# Patient Record
Sex: MALE | Race: BLACK OR AFRICAN AMERICAN | NOT HISPANIC OR LATINO | URBAN - METROPOLITAN AREA
[De-identification: names, ages, dates, MRNs, and addresses within clinical notes are randomized per-mention and may not be internally consistent; named-entity substitution may affect disease eponyms.]

---

## 2019-07-01 ENCOUNTER — EMERGENCY (EMERGENCY)
Facility: HOSPITAL | Age: 64
LOS: 1 days | End: 2019-07-01
Admitting: EMERGENCY MEDICINE
Payer: COMMERCIAL

## 2019-07-01 PROCEDURE — 73130 X-RAY EXAM OF HAND: CPT | Mod: 26,RT

## 2019-07-01 PROCEDURE — 99283 EMERGENCY DEPT VISIT LOW MDM: CPT

## 2020-09-14 VITALS
DIASTOLIC BLOOD PRESSURE: 80 MMHG | WEIGHT: 222 LBS | HEART RATE: 50 BPM | TEMPERATURE: 97.5 F | OXYGEN SATURATION: 99 % | SYSTOLIC BLOOD PRESSURE: 140 MMHG | BODY MASS INDEX: 31.08 KG/M2 | HEIGHT: 71 IN

## 2021-05-06 ENCOUNTER — NON-APPOINTMENT (OUTPATIENT)
Age: 66
End: 2021-05-06

## 2021-05-06 ENCOUNTER — RESULT CHARGE (OUTPATIENT)
Age: 66
End: 2021-05-06

## 2021-05-06 DIAGNOSIS — Z87.438 PERSONAL HISTORY OF OTHER DISEASES OF MALE GENITAL ORGANS: ICD-10-CM

## 2021-05-06 DIAGNOSIS — Z87.891 PERSONAL HISTORY OF NICOTINE DEPENDENCE: ICD-10-CM

## 2021-05-06 DIAGNOSIS — Z87.828 PERSONAL HISTORY OF OTHER (HEALED) PHYSICAL INJURY AND TRAUMA: ICD-10-CM

## 2021-05-07 ENCOUNTER — NON-APPOINTMENT (OUTPATIENT)
Age: 66
End: 2021-05-07

## 2021-05-07 ENCOUNTER — APPOINTMENT (OUTPATIENT)
Dept: FAMILY MEDICINE | Facility: CLINIC | Age: 66
End: 2021-05-07
Payer: COMMERCIAL

## 2021-05-07 VITALS
SYSTOLIC BLOOD PRESSURE: 160 MMHG | BODY MASS INDEX: 28.98 KG/M2 | DIASTOLIC BLOOD PRESSURE: 90 MMHG | WEIGHT: 207 LBS | HEIGHT: 71 IN | TEMPERATURE: 97.3 F | HEART RATE: 62 BPM | OXYGEN SATURATION: 97 %

## 2021-05-07 DIAGNOSIS — Z00.00 ENCOUNTER FOR GENERAL ADULT MEDICAL EXAMINATION W/OUT ABNORMAL FINDINGS: ICD-10-CM

## 2021-05-07 LAB
BILIRUB UR QL STRIP: NORMAL
CLARITY UR: CLEAR
COLLECTION METHOD: NORMAL
GLUCOSE UR-MCNC: NORMAL
HCG UR QL: 0.2 EU/DL
HGB UR QL STRIP.AUTO: NORMAL
KETONES UR-MCNC: NORMAL
LEUKOCYTE ESTERASE UR QL STRIP: NORMAL
NITRITE UR QL STRIP: NORMAL
PH UR STRIP: 7.5
PROT UR STRIP-MCNC: 100
SP GR UR STRIP: 1.02

## 2021-05-07 PROCEDURE — 99072 ADDL SUPL MATRL&STAF TM PHE: CPT

## 2021-05-07 PROCEDURE — 99396 PREV VISIT EST AGE 40-64: CPT | Mod: 25

## 2021-05-07 PROCEDURE — 81003 URINALYSIS AUTO W/O SCOPE: CPT | Mod: NC,QW

## 2021-05-07 PROCEDURE — 93000 ELECTROCARDIOGRAM COMPLETE: CPT

## 2021-05-07 NOTE — PHYSICAL EXAM
[No Edema] : there was no peripheral edema [Normal Appearance] : normal in appearance [Declined Rectal Exam] : declined rectal exam [Normal] : affect was normal and insight and judgment were intact

## 2021-05-07 NOTE — REVIEW OF SYSTEMS
[Recent Change In Weight] : ~T recent weight change [Negative] : Neurological [Abdominal Pain] : no abdominal pain [Vomiting] : no vomiting [FreeTextEntry2] : 15 POUND WEIGHT  1N  7 MONTHS [FreeTextEntry7] : URGENCY INCONT. OF BOWELS [FreeTextEntry8] : URGENCY OF BOWEL MOVEMENTS

## 2021-05-07 NOTE — PLAN
[FreeTextEntry1] : DISCUSSED NEED FOR GI CONSULT  15 POUND WEIGHT LOSS  BOWEL URGENCY WITH INCONTENENCE\par LIST GIVE\par \par \par EKG   T WAVE  ABNORMALITY  AS COMPARED TO PREVIOUS  EKG  2019\par \par CARD  REF   LIST GIVEN\par \par RX GIVEN FOR FASTING LABS\par \par

## 2021-05-07 NOTE — HISTORY OF PRESENT ILLNESS
[FreeTextEntry1] : HAS BEEN OFF MEDS FOR SEVERAL MONTHS\par \par 15 POUND WEIGHT LOSS\par DIFFICULY WITH BOWELS\par URGENCY INCONTENANC\par COLONOSCOPY  GREATER THEN 5 YEARS AGO\par \par FAMILY HISTORY\par NEG  FOR CAD  DIAB  AND CANCER

## 2022-06-16 ENCOUNTER — APPOINTMENT (OUTPATIENT)
Dept: CT IMAGING | Facility: CLINIC | Age: 67
End: 2022-06-16
Payer: COMMERCIAL

## 2022-06-16 PROCEDURE — 73700 CT LOWER EXTREMITY W/O DYE: CPT | Mod: LT

## 2023-01-30 ENCOUNTER — NON-APPOINTMENT (OUTPATIENT)
Age: 68
End: 2023-01-30

## 2023-01-30 ENCOUNTER — APPOINTMENT (OUTPATIENT)
Dept: FAMILY MEDICINE | Facility: CLINIC | Age: 68
End: 2023-01-30
Payer: COMMERCIAL

## 2023-01-30 VITALS
BODY MASS INDEX: 30.88 KG/M2 | SYSTOLIC BLOOD PRESSURE: 166 MMHG | HEART RATE: 90 BPM | DIASTOLIC BLOOD PRESSURE: 98 MMHG | TEMPERATURE: 97.3 F | OXYGEN SATURATION: 98 % | WEIGHT: 233 LBS | HEIGHT: 73 IN | RESPIRATION RATE: 15 BRPM

## 2023-01-30 PROCEDURE — 99214 OFFICE O/P EST MOD 30 MIN: CPT

## 2023-01-30 NOTE — HISTORY OF PRESENT ILLNESS
[FreeTextEntry8] : presents with cough\par \par increases at night\par \par has not been taking any meds for it\par \par \par stopped  b/p  and statin meds greater then 2 years ago

## 2023-01-30 NOTE — PLAN
[FreeTextEntry1] : discussed importance of managing blood pressure\par multiple  risk factors for heart disease\par \par card.  ref\par \par \par OTC  cough meds that are made for high b/p\par \par RTO  for physical  with fasting  labs\par

## 2023-02-01 LAB
RAPID RVP RESULT: NOT DETECTED
SARS-COV-2 RNA PNL RESP NAA+PROBE: NOT DETECTED

## 2023-02-13 ENCOUNTER — APPOINTMENT (OUTPATIENT)
Dept: CARDIOLOGY | Facility: CLINIC | Age: 68
End: 2023-02-13

## 2023-04-28 ENCOUNTER — RX RENEWAL (OUTPATIENT)
Age: 68
End: 2023-04-28

## 2023-06-15 ENCOUNTER — APPOINTMENT (OUTPATIENT)
Dept: FAMILY MEDICINE | Facility: CLINIC | Age: 68
End: 2023-06-15
Payer: COMMERCIAL

## 2023-06-15 VITALS
DIASTOLIC BLOOD PRESSURE: 78 MMHG | HEART RATE: 63 BPM | OXYGEN SATURATION: 97 % | BODY MASS INDEX: 30.88 KG/M2 | TEMPERATURE: 97.2 F | SYSTOLIC BLOOD PRESSURE: 110 MMHG | WEIGHT: 233 LBS | RESPIRATION RATE: 15 BRPM | HEIGHT: 73 IN

## 2023-06-15 PROCEDURE — 99496 TRANSJ CARE MGMT HIGH F2F 7D: CPT

## 2023-06-19 ENCOUNTER — NON-APPOINTMENT (OUTPATIENT)
Age: 68
End: 2023-06-19

## 2023-06-19 ENCOUNTER — APPOINTMENT (OUTPATIENT)
Dept: CARDIOLOGY | Facility: CLINIC | Age: 68
End: 2023-06-19
Payer: COMMERCIAL

## 2023-06-19 ENCOUNTER — APPOINTMENT (OUTPATIENT)
Dept: FAMILY MEDICINE | Facility: CLINIC | Age: 68
End: 2023-06-19
Payer: COMMERCIAL

## 2023-06-19 VITALS
WEIGHT: 227 LBS | HEART RATE: 59 BPM | OXYGEN SATURATION: 98 % | SYSTOLIC BLOOD PRESSURE: 110 MMHG | HEIGHT: 73 IN | DIASTOLIC BLOOD PRESSURE: 80 MMHG | TEMPERATURE: 98.1 F | BODY MASS INDEX: 30.09 KG/M2

## 2023-06-19 DIAGNOSIS — Z82.49 FAMILY HISTORY OF ISCHEMIC HEART DISEASE AND OTHER DISEASES OF THE CIRCULATORY SYSTEM: ICD-10-CM

## 2023-06-19 DIAGNOSIS — R15.2 FULL INCONTINENCE OF FECES: ICD-10-CM

## 2023-06-19 DIAGNOSIS — Z86.59 PERSONAL HISTORY OF OTHER MENTAL AND BEHAVIORAL DISORDERS: ICD-10-CM

## 2023-06-19 DIAGNOSIS — Z92.89 PERSONAL HISTORY OF OTHER MEDICAL TREATMENT: ICD-10-CM

## 2023-06-19 DIAGNOSIS — F10.21 ALCOHOL DEPENDENCE, IN REMISSION: ICD-10-CM

## 2023-06-19 DIAGNOSIS — R15.9 FULL INCONTINENCE OF FECES: ICD-10-CM

## 2023-06-19 DIAGNOSIS — Z78.9 OTHER SPECIFIED HEALTH STATUS: ICD-10-CM

## 2023-06-19 PROCEDURE — 93000 ELECTROCARDIOGRAM COMPLETE: CPT

## 2023-06-19 PROCEDURE — 99205 OFFICE O/P NEW HI 60 MIN: CPT | Mod: 25

## 2023-06-19 PROCEDURE — 99214 OFFICE O/P EST MOD 30 MIN: CPT

## 2023-06-19 NOTE — HISTORY OF PRESENT ILLNESS
[FreeTextEntry1] : 67 years old -American gentleman came for cardiac evaluation after discharge from [facility], I have reviewed discharge summary.\par \par He was admitted for uncontrolled hypertension, initially he needed nitroglycerin drip, subsequently his blood pressure is well controlled on p.o. medications.  He also had metabolic encephalopathy requiring psych evaluation.  Echocardiogram confirmed dilated hypertrophied LV with LVIDD 6.6, LVEF 25%.  Nuclear stress test showed small to medium size defect of mild to moderate severity in the basal inferior and apical inferior wall suggestive of infarct with a diaphragmatic attenuation artifact.  CT brain did not confirm any infarct.  He was diagnosed to have acute on chronic systolic heart failure.\par \par Since discharge patient has been doing well.  He denies any chest pain, PND, orthopnea, diaphoresis, dizziness, palpitation Edema.  He is avoiding salt.

## 2023-06-19 NOTE — HISTORY OF PRESENT ILLNESS
[FreeTextEntry1] : improved  [de-identified] : from hosp \par short term memory \par coreg lisiopril spironolactone lasix  atorvastatin asa\par folic acid \par aneurysm \par cardiology \par cough lisinopriil\par

## 2023-06-19 NOTE — PLAN
[FreeTextEntry1] : 67-year-old gentleman presents for evaluation.  Accompanied by his wife.  Recently discharged from the hospital he returns for follow-up on his second visit.  They bring in a list of his medications\par Congestive cardiomyopathy–Lasix lisinopril and beta blockage.  Wife complaining that he coughs constantly.  A trial off lisinopril was offered\par 110/80 pulse rate 59 and regular this is 6 foot 1 inch 227 pound gentleman who is now planning to retire from his job\par Cognitive issues–memory loss is described by both the patient and his wife.  No medications at this time he still able to function highly\par Hyperlipidemia–controlled with statin periodic lab work is drawn.  He is ordered for a fasting blood profile

## 2023-06-23 ENCOUNTER — APPOINTMENT (OUTPATIENT)
Dept: FAMILY MEDICINE | Facility: CLINIC | Age: 68
End: 2023-06-23
Payer: COMMERCIAL

## 2023-06-23 VITALS
WEIGHT: 221 LBS | DIASTOLIC BLOOD PRESSURE: 64 MMHG | TEMPERATURE: 97.8 F | BODY MASS INDEX: 29.29 KG/M2 | SYSTOLIC BLOOD PRESSURE: 118 MMHG | HEIGHT: 73 IN | RESPIRATION RATE: 16 BRPM | HEART RATE: 60 BPM | OXYGEN SATURATION: 96 %

## 2023-06-23 DIAGNOSIS — H66.90 OTITIS MEDIA, UNSPECIFIED, UNSPECIFIED EAR: ICD-10-CM

## 2023-06-23 PROCEDURE — 99213 OFFICE O/P EST LOW 20 MIN: CPT

## 2023-06-23 RX ORDER — LISINOPRIL 20 MG/1
20 TABLET ORAL DAILY
Qty: 90 | Refills: 1 | Status: DISCONTINUED | COMMUNITY
Start: 2023-06-14 | End: 2023-06-23

## 2023-06-23 NOTE — PLAN
[FreeTextEntry1] : Otitis media/ sinus infection- prescribed augmentin 875mg BID for 7 days. \par \par Cough- dry/non productive. no wheezing or adventitious sounds noted. Patient will continue to monitor. If wheezing, sob, difficulty breathing or worsening of cough occurs contact office or go to ER. Patient advised on OTC Sinus and cold medications due to HTN. \par \par

## 2023-06-23 NOTE — HISTORY OF PRESENT ILLNESS
[FreeTextEntry8] : Patient presents for continued cough since stopping lisinopril, left ear problems and sinus congestion. He denies ear pain, but has a crackling sensation. Symptoms have continued since his last visit. Cough is nonproductive, he denies fever, wheezing, sob, chest pain or other complaints at this time. He denies sinus drainage or throat pain. Just maxillary sinus pressure. Wife is at bedside.

## 2023-07-04 NOTE — HISTORY OF PRESENT ILLNESS
[Discharge Summary] : discharge summary [Discharge Med List] : discharge medication list [Med Reconciliation] : medication reconciliation has been completed [Patient Contacted By: ____] : and contacted by [unfilled] [Post-hospitalization from ___ Hospital] : Post-hospitalization from [unfilled] Hospital [Discharged on ___] : discharged on [unfilled] [FreeTextEntry2] : chf  /  Mi?\par ischemic mi \par

## 2023-07-04 NOTE — PLAN
[FreeTextEntry1] : 67-year-old gentleman, accompanied by his wife, presents status post hospitalization\par He was hospitalized on June 60 admitted with shortness of breath weakness and confusion\par He presents confused pleasant with memory issues\par Acute on chronic congestive heart failure–medications titrated\par Lisinopril/Coreg/Lasix/spironolactone/aspirin\par Hypertension controlled with medication\par 110/70\par Ischemic MRI–follows with cardiology\par Medication titrated\par Encephalopathy–during his hospital stay he had cognitive D flat defect and was acting out causing the use of Abilify for behavioral control\par Aripiprazole DC'd\par The wife is aware to call me for any issues\par They will return next week for blood pressure monitoring and medication titration\par Medication is held today and he will be monitored by his wife and follow-up

## 2023-07-24 ENCOUNTER — NON-APPOINTMENT (OUTPATIENT)
Age: 68
End: 2023-07-24

## 2023-07-24 ENCOUNTER — APPOINTMENT (OUTPATIENT)
Dept: FAMILY MEDICINE | Facility: CLINIC | Age: 68
End: 2023-07-24
Payer: COMMERCIAL

## 2023-07-24 VITALS
TEMPERATURE: 98.6 F | BODY MASS INDEX: 29.19 KG/M2 | DIASTOLIC BLOOD PRESSURE: 70 MMHG | SYSTOLIC BLOOD PRESSURE: 109 MMHG | HEART RATE: 62 BPM | OXYGEN SATURATION: 94 % | HEIGHT: 73 IN | WEIGHT: 220.25 LBS

## 2023-07-24 PROCEDURE — 93000 ELECTROCARDIOGRAM COMPLETE: CPT

## 2023-07-24 PROCEDURE — 99213 OFFICE O/P EST LOW 20 MIN: CPT | Mod: 25

## 2023-07-24 NOTE — PLAN
[FreeTextEntry1] : Pain with deep breath- stopped yesterday. EKG reviewed by Dr. Mtz. No acute changes or concerns. Educated patient EKG is a "snapshot". It can show past damage but not show future damage or problems. Educated if pain or symptoms return to go to ER for evaluation. Patient and spouse verbalized understanding. \par \par Patient has appointment with his cardiologist next month.

## 2023-07-24 NOTE — REVIEW OF SYSTEMS
[Chest Pain] : chest pain [Negative] : Heme/Lymph [Shortness Of Breath] : no shortness of breath [Cough] : no cough [Dyspnea on Exertion] : not dyspnea on exertion

## 2023-07-24 NOTE — HISTORY OF PRESENT ILLNESS
[FreeTextEntry8] : Patient presents for left sided chest pain. States pain started after he was working with a saw on a garage door. Patient was sitting when pain started. Pain was a dull/sharp pain when he took a deep breath. Pain was yesterday. He denies pain today. He denies cough or other complaints at this time.

## 2023-08-03 ENCOUNTER — APPOINTMENT (OUTPATIENT)
Dept: FAMILY MEDICINE | Facility: CLINIC | Age: 68
End: 2023-08-03
Payer: COMMERCIAL

## 2023-08-03 VITALS
TEMPERATURE: 97.8 F | HEART RATE: 56 BPM | BODY MASS INDEX: 28.94 KG/M2 | SYSTOLIC BLOOD PRESSURE: 140 MMHG | DIASTOLIC BLOOD PRESSURE: 90 MMHG | WEIGHT: 218.38 LBS | OXYGEN SATURATION: 99 % | HEIGHT: 73 IN

## 2023-08-03 PROCEDURE — 99213 OFFICE O/P EST LOW 20 MIN: CPT

## 2023-08-03 NOTE — HISTORY OF PRESENT ILLNESS
[FreeTextEntry8] : Patient presents for rash to left leg. It started 2 weeks ago. It is currently dry and, patchy and pink in color. It was originally bright red. He denies fevers, body aches or joint pain at this time. He works outside constantly.

## 2023-08-03 NOTE — PLAN
[FreeTextEntry1] : Rash- tick panel ordered. denies previous tick illnesses. Clobetasol cream BID for possible dermatitis. Patient will go to outside lab due to difficult draw. Informed to call office if fevers, joint pain or worsening of rash occurs.

## 2023-08-28 ENCOUNTER — APPOINTMENT (OUTPATIENT)
Dept: CARDIOLOGY | Facility: CLINIC | Age: 68
End: 2023-08-28

## 2023-09-06 ENCOUNTER — RX RENEWAL (OUTPATIENT)
Age: 68
End: 2023-09-06

## 2023-09-14 ENCOUNTER — APPOINTMENT (OUTPATIENT)
Dept: FAMILY MEDICINE | Facility: CLINIC | Age: 68
End: 2023-09-14
Payer: COMMERCIAL

## 2023-09-14 ENCOUNTER — LABORATORY RESULT (OUTPATIENT)
Age: 68
End: 2023-09-14

## 2023-09-14 VITALS
SYSTOLIC BLOOD PRESSURE: 159 MMHG | HEIGHT: 73 IN | TEMPERATURE: 98 F | WEIGHT: 225.5 LBS | BODY MASS INDEX: 29.88 KG/M2 | HEART RATE: 51 BPM | OXYGEN SATURATION: 99 % | DIASTOLIC BLOOD PRESSURE: 100 MMHG

## 2023-09-14 PROCEDURE — 36415 COLL VENOUS BLD VENIPUNCTURE: CPT

## 2023-09-14 PROCEDURE — 99214 OFFICE O/P EST MOD 30 MIN: CPT | Mod: 25

## 2023-09-20 LAB
ANAPLASMA PHAGOCYTOPHILIA IGG ANTIBODIES: NEGATIVE
ANAPLASMA PHAGOCYTOPHILIA IGM ANTIBODIES: NEGATIVE
EHRLICIA CHAFFEENIS IGG ANTIBODIES: NEGATIVE
EHRLICIA CHAFFEENIS IGM ANTIBODIES: NEGATIVE
ERHLICIA RESULT COMMENT: NORMAL

## 2023-09-28 ENCOUNTER — APPOINTMENT (OUTPATIENT)
Dept: FAMILY MEDICINE | Facility: CLINIC | Age: 68
End: 2023-09-28
Payer: COMMERCIAL

## 2023-09-28 VITALS
SYSTOLIC BLOOD PRESSURE: 130 MMHG | BODY MASS INDEX: 30.22 KG/M2 | HEIGHT: 73 IN | TEMPERATURE: 97.6 F | OXYGEN SATURATION: 98 % | WEIGHT: 228 LBS | DIASTOLIC BLOOD PRESSURE: 84 MMHG | RESPIRATION RATE: 16 BRPM | HEART RATE: 63 BPM

## 2023-09-28 PROCEDURE — 99213 OFFICE O/P EST LOW 20 MIN: CPT

## 2023-10-02 LAB
BABESIA ANTIBODIES, IGG: NORMAL
BABESIA ANTIBODIES, IGM: NORMAL
BABESIA RESULT COMMENT: NORMAL

## 2023-10-04 ENCOUNTER — APPOINTMENT (OUTPATIENT)
Dept: CARDIOLOGY | Facility: CLINIC | Age: 68
End: 2023-10-04
Payer: COMMERCIAL

## 2023-10-04 VITALS
DIASTOLIC BLOOD PRESSURE: 60 MMHG | OXYGEN SATURATION: 95 % | HEART RATE: 57 BPM | SYSTOLIC BLOOD PRESSURE: 122 MMHG | WEIGHT: 236 LBS | BODY MASS INDEX: 31.14 KG/M2

## 2023-10-04 DIAGNOSIS — S80.862A INSECT BITE (NONVENOMOUS), LEFT LOWER LEG, INITIAL ENCOUNTER: ICD-10-CM
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
## 2023-10-04 DIAGNOSIS — B36.9 SUPERFICIAL MYCOSIS, UNSPECIFIED: ICD-10-CM
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
## 2023-10-04 PROCEDURE — 99215 OFFICE O/P EST HI 40 MIN: CPT

## 2023-10-23 ENCOUNTER — RX RENEWAL (OUTPATIENT)
Age: 68
End: 2023-10-23

## 2023-11-02 ENCOUNTER — APPOINTMENT (OUTPATIENT)
Dept: CARDIOLOGY | Facility: CLINIC | Age: 68
End: 2023-11-02
Payer: COMMERCIAL

## 2023-11-02 VITALS
OXYGEN SATURATION: 95 % | BODY MASS INDEX: 30.35 KG/M2 | HEART RATE: 69 BPM | DIASTOLIC BLOOD PRESSURE: 60 MMHG | SYSTOLIC BLOOD PRESSURE: 112 MMHG | WEIGHT: 230 LBS

## 2023-11-02 PROCEDURE — 99214 OFFICE O/P EST MOD 30 MIN: CPT

## 2023-12-01 ENCOUNTER — RX RENEWAL (OUTPATIENT)
Age: 68
End: 2023-12-01

## 2023-12-04 LAB — HBA1C MFR BLD HPLC: 5.9

## 2023-12-05 ENCOUNTER — RX CHANGE (OUTPATIENT)
Age: 68
End: 2023-12-05

## 2023-12-05 ENCOUNTER — APPOINTMENT (OUTPATIENT)
Dept: CARDIOLOGY | Facility: CLINIC | Age: 68
End: 2023-12-05
Payer: COMMERCIAL

## 2023-12-05 VITALS
SYSTOLIC BLOOD PRESSURE: 104 MMHG | DIASTOLIC BLOOD PRESSURE: 60 MMHG | OXYGEN SATURATION: 95 % | HEART RATE: 58 BPM | BODY MASS INDEX: 30.35 KG/M2 | WEIGHT: 230 LBS

## 2023-12-05 PROCEDURE — 93306 TTE W/DOPPLER COMPLETE: CPT

## 2023-12-05 PROCEDURE — 99215 OFFICE O/P EST HI 40 MIN: CPT | Mod: 25

## 2023-12-05 RX ORDER — CLOBETASOL PROPIONATE 0.5 MG/G
0.05 CREAM TOPICAL
Qty: 180 | Refills: 1 | Status: ACTIVE | COMMUNITY
Start: 1900-01-01 | End: 1900-01-01

## 2023-12-05 RX ORDER — CLOBETASOL PROPIONATE 0.5 MG/G
0.05 CREAM TOPICAL
Qty: 30 | Refills: 0 | Status: DISCONTINUED | COMMUNITY
Start: 2023-12-01 | End: 2023-12-05

## 2023-12-28 ENCOUNTER — APPOINTMENT (OUTPATIENT)
Dept: FAMILY MEDICINE | Facility: CLINIC | Age: 68
End: 2023-12-28
Payer: COMMERCIAL

## 2023-12-28 VITALS
TEMPERATURE: 97.3 F | SYSTOLIC BLOOD PRESSURE: 116 MMHG | DIASTOLIC BLOOD PRESSURE: 68 MMHG | WEIGHT: 225.5 LBS | BODY MASS INDEX: 29.88 KG/M2 | OXYGEN SATURATION: 99 % | HEIGHT: 73 IN | HEART RATE: 58 BPM

## 2023-12-28 PROCEDURE — 99213 OFFICE O/P EST LOW 20 MIN: CPT

## 2023-12-28 NOTE — HISTORY OF PRESENT ILLNESS
[FreeTextEntry1] : med refills [de-identified] : Patient presents for 6 month follow up. Complains of dry cough over the past week, but is improving. Denies other concerns or complaints at this time.  Follows with cardio.

## 2023-12-28 NOTE — PLAN
[FreeTextEntry1] : HTN- /68, well controlled with medications. Continue spironolactone 25mg once daily. Medications reviewed and renewed.  Cough likely viral in nature. No adventitious lung sounds noted. If cough worsens followup in office for eval. Patient verbalized understanding. Discussed colon cancer screening- he will go to his wifes gastroenterologist. Denies needing a referral at this time. f/u in 6 months.

## 2023-12-28 NOTE — HEALTH RISK ASSESSMENT
[No falls in past year] : Patient reported no falls in the past year [0] : 2) Feeling down, depressed, or hopeless: Not at all (0) [PHQ-2 Negative - No further assessment needed] : PHQ-2 Negative - No further assessment needed [[id number]] : 0

## 2023-12-29 ENCOUNTER — RX RENEWAL (OUTPATIENT)
Age: 68
End: 2023-12-29

## 2024-02-01 ENCOUNTER — APPOINTMENT (OUTPATIENT)
Dept: FAMILY MEDICINE | Facility: CLINIC | Age: 69
End: 2024-02-01
Payer: COMMERCIAL

## 2024-02-01 VITALS
SYSTOLIC BLOOD PRESSURE: 130 MMHG | OXYGEN SATURATION: 98 % | HEIGHT: 73 IN | HEART RATE: 53 BPM | TEMPERATURE: 97.7 F | BODY MASS INDEX: 31.33 KG/M2 | DIASTOLIC BLOOD PRESSURE: 64 MMHG | WEIGHT: 236.38 LBS

## 2024-02-01 PROCEDURE — 99212 OFFICE O/P EST SF 10 MIN: CPT

## 2024-02-02 NOTE — HISTORY OF PRESENT ILLNESS
[FreeTextEntry8] : Patient presents for continued rash to left upper leg. He has been using clobetasol propionate 0.05% twice daily with no improvement.  There is no discharge noted from the rash.  He denies fevers body aches joint pain or other complaints at this time.

## 2024-02-02 NOTE — PLAN
[FreeTextEntry1] : Skin rash- continued skin rash despite topical treatment, discussed with patient who will follow up with dermatology for further evaluation. Referral ordered.

## 2024-02-04 ENCOUNTER — RX RENEWAL (OUTPATIENT)
Age: 69
End: 2024-02-04

## 2024-02-12 ENCOUNTER — RX RENEWAL (OUTPATIENT)
Age: 69
End: 2024-02-12

## 2024-02-28 ENCOUNTER — NON-APPOINTMENT (OUTPATIENT)
Age: 69
End: 2024-02-28

## 2024-02-29 LAB — HBA1C MFR BLD HPLC: 6

## 2024-03-05 ENCOUNTER — APPOINTMENT (OUTPATIENT)
Dept: CARDIOLOGY | Facility: CLINIC | Age: 69
End: 2024-03-05
Payer: COMMERCIAL

## 2024-03-05 ENCOUNTER — NON-APPOINTMENT (OUTPATIENT)
Age: 69
End: 2024-03-05

## 2024-03-05 VITALS
DIASTOLIC BLOOD PRESSURE: 62 MMHG | HEIGHT: 73 IN | OXYGEN SATURATION: 97 % | SYSTOLIC BLOOD PRESSURE: 120 MMHG | WEIGHT: 241 LBS | HEART RATE: 58 BPM | BODY MASS INDEX: 31.94 KG/M2

## 2024-03-05 DIAGNOSIS — Z87.898 PERSONAL HISTORY OF OTHER SPECIFIED CONDITIONS: ICD-10-CM

## 2024-03-05 DIAGNOSIS — R21 RASH AND OTHER NONSPECIFIC SKIN ERUPTION: ICD-10-CM

## 2024-03-05 PROCEDURE — G2211 COMPLEX E/M VISIT ADD ON: CPT | Mod: NC,1L

## 2024-03-05 PROCEDURE — 93000 ELECTROCARDIOGRAM COMPLETE: CPT

## 2024-03-05 PROCEDURE — 99214 OFFICE O/P EST MOD 30 MIN: CPT

## 2024-03-05 NOTE — HISTORY OF PRESENT ILLNESS
[FreeTextEntry1] : 68 years old -American gentleman with history of hypertension, dyslipidemia, hypogonadism, history of CAD, history of heart failure came for follow-up.  He denies any chest pain, PND, orthopnea, diaphoresis, dizziness, palpitation Edema.  He is avoiding salt.  Currently he is on  Entresto 49/51 mg twice daily , Coreg 25 mg twice daily, Aldactone 25 mg daily; he is tolerating very well.  He has started to walk and able to do that  February 29, 2024   BMP normal except fasting blood sugar 104, LDL 78, A1c 6.0 .December 5, 2023   echocardiogram showed normal size, LV thickness, LVEF 50 to 55%, moderate eccentric anteriorly directed jet of mitral regurgitation, PASP 34 mmHg, grade 1 diastolic dysfunction; completely resolved LV dysfunction  He was admitted for uncontrolled hypertension, initially he needed nitroglycerin drip, subsequently his blood pressure is well controlled on p.o. medications.  He also had metabolic encephalopathy requiring psych evaluation.  Echocardiogram confirmed dilated hypertrophied LV with LVIDD 6.6, LVEF 25%.  Nuclear stress test showed small to medium size defect of mild to moderate severity in the basal inferior and apical inferior wall suggestive of infarct with a diaphragmatic attenuation artifact.  CT brain did not confirm any infarct.  He was diagnosed to have acute on chronic systolic heart failure.    He has no recurrent episode of heart failure.  He is compliant medication.  Recently losartan has been added to his regimen.

## 2024-03-05 NOTE — DISCUSSION/SUMMARY
[FreeTextEntry1] : Cardiomyopathy - past admission to hospital for acute heart failure.   Increase Entresto 97/103 mg twice daily.,  Coreg 25 mg twice daily, Aldactone 25 mg daily .  No room to uptitrate Entresto. Echocardiogram confirmed LVEF 50 to 55% with moderate mitral regurgitation with normal LV size.  No evidence of volume overload.  Will discontinue Lasix at this point.  No need for ICD placement.  Echocardiogram for reevaluation of LVEF and mitral regurgitation  Hypertension -well-controlled; low-salt diet, continue medications, BP monitoring.  Obesity -weight reduction with diet and exercise.  Prediabetes -long-term goals blood pressure less than 130/80, A1c less than 7, LDL 70; diabetic diet.  Risk factor modification has been discussed with her at great length graded walking, compliance to medication, low-salt/low-cholesterol/diabetic diet. He will be reeval by me in 3 months.  Red flag symptoms has been discussed with him and his wife.  Suspect sleep apnea syndrome -consider sleep study.

## 2024-03-19 RX ORDER — FUROSEMIDE 20 MG/1
20 TABLET ORAL
Qty: 90 | Refills: 0 | Status: DISCONTINUED | COMMUNITY
Start: 2023-06-14 | End: 2024-03-19

## 2024-03-20 ENCOUNTER — APPOINTMENT (OUTPATIENT)
Dept: CARDIOLOGY | Facility: CLINIC | Age: 69
End: 2024-03-20
Payer: COMMERCIAL

## 2024-03-20 PROCEDURE — 93306 TTE W/DOPPLER COMPLETE: CPT

## 2024-03-21 ENCOUNTER — NON-APPOINTMENT (OUTPATIENT)
Age: 69
End: 2024-03-21

## 2024-03-21 ENCOUNTER — RX RENEWAL (OUTPATIENT)
Age: 69
End: 2024-03-21

## 2024-03-28 ENCOUNTER — APPOINTMENT (OUTPATIENT)
Dept: FAMILY MEDICINE | Facility: CLINIC | Age: 69
End: 2024-03-28

## 2024-04-01 ENCOUNTER — RESULT CHARGE (OUTPATIENT)
Age: 69
End: 2024-04-01

## 2024-04-01 ENCOUNTER — RX RENEWAL (OUTPATIENT)
Age: 69
End: 2024-04-01

## 2024-04-02 ENCOUNTER — APPOINTMENT (OUTPATIENT)
Dept: CARDIOLOGY | Facility: CLINIC | Age: 69
End: 2024-04-02
Payer: COMMERCIAL

## 2024-04-02 ENCOUNTER — NON-APPOINTMENT (OUTPATIENT)
Age: 69
End: 2024-04-02

## 2024-04-02 VITALS
SYSTOLIC BLOOD PRESSURE: 108 MMHG | OXYGEN SATURATION: 96 % | WEIGHT: 237 LBS | BODY MASS INDEX: 31.27 KG/M2 | DIASTOLIC BLOOD PRESSURE: 62 MMHG | HEART RATE: 75 BPM

## 2024-04-02 PROCEDURE — 93000 ELECTROCARDIOGRAM COMPLETE: CPT

## 2024-04-02 PROCEDURE — 99214 OFFICE O/P EST MOD 30 MIN: CPT

## 2024-04-02 RX ORDER — ASPIRIN 81 MG/1
81 TABLET, COATED ORAL DAILY
Qty: 90 | Refills: 0 | Status: DISCONTINUED | COMMUNITY
Start: 2023-06-14 | End: 2024-04-02

## 2024-04-02 RX ORDER — LOSARTAN POTASSIUM 50 MG/1
50 TABLET, FILM COATED ORAL
Qty: 30 | Refills: 0 | Status: DISCONTINUED | COMMUNITY
Start: 2023-09-14 | End: 2024-04-02

## 2024-04-02 NOTE — HISTORY OF PRESENT ILLNESS
[FreeTextEntry1] : 68 years old -American gentleman with history of hypertension, dyslipidemia, hypogonadism, history of CAD, history of heart failure came for follow-up.  Patient feels well, no complaints of SOB, Orthopnea PND or chest pain. He is here for clearance for a colonoscopy. Echo 3/2024:  Left ventricular systolic function is mildly decreased with an ejection fraction of 46 % by Turcios's method of disks with an ejection fraction visually estimated at 45 to 50 %. (50-55%) 2. There is mild (grade 1) left ventricular diastolic dysfunction. 3. Mild left ventricular hypertrophy. 4. Mild mitral regurgitation. 5. Trace tricuspid regurgitation. Estimated pulmonary artery systolic pressure is 24 mmHg. 6. Trace pulmonic regurgitation. 7. Aortic root at the sinuses of Valsalva is dilated, measuring 4.60 cm (indexed 1.98 cm/m). Ascending aorta diameter is dilated, measuring 3.90 cm (indexed 1.68 cm/m). Aortic arch diameter is normal in size, measuring 2.7 cm (indexed 1.17 cm/m). 8. Interatrial septum is aneurysmal. 9. Compared to prior done on 12/5/2023, slight decrease in EF. Slight increase in aortic root diameter (previously 4.2 cm). Tolerated increase in Entresto well.     February 29, 2024   BMP normal except fasting blood sugar 104, LDL 78, A1c 6.0 .December 5, 2023   echocardiogram showed normal size, LV thickness, LVEF 50 to 55%, moderate eccentric anteriorly directed jet of mitral regurgitation, PASP 34 mmHg, grade 1 diastolic dysfunction; completely resolved LV dysfunction  He was admitted for uncontrolled hypertension, initially he needed nitroglycerin drip, subsequently his blood pressure is well controlled on p.o. medications.  He also had metabolic encephalopathy requiring psych evaluation.  Echocardiogram confirmed dilated hypertrophied LV with LVIDD 6.6, LVEF 25%.  Nuclear stress test showed small to medium size defect of mild to moderate severity in the basal inferior and apical inferior wall suggestive of infarct with a diaphragmatic attenuation artifact.  CT brain did not confirm any infarct.  He was diagnosed to have acute on chronic systolic heart failure.    He has no recurrent episode of heart failure.  He is compliant medication.  Recently losartan has been added to his regimen.

## 2024-04-02 NOTE — DISCUSSION/SUMMARY
[FreeTextEntry1] : Cardiomyopathy - past admission to hospital for acute heart failure.   Increase Entresto 97/103 mg twice daily.,  Coreg 25 mg twice daily, Aldactone 25 mg daily .   Discussed starting SGLT2, however pts wife declined and will discuss initiation at next visit.  Hypertension -well-controlled; low-salt diet, continue medications, BP monitoring. Preoperative cardiovascular examination. _________ At present, there are no active cardiac conditions.  No recent unstable coronary syndromes, decompensated heart failure, severe valvular heart disease or significant dysrhythmias.   Baseline functional status is acceptable/limited.     The clinical benefit of the proposed procedure outweighs the associated cardiovascular risk.   Risk not attenuated with further CV testing.   Prior testing as outlined above. Optimized from a cardiovascular perspective. continue ASA Continue beta blocker DVT ppx Even fluid balance with h/o CHF .  Risk factor modification has been discussed with her at great length graded walking, compliance to medication, low-salt/low-cholesterol/diabetic diet. [EKG obtained to assist in diagnosis and management of assessed problem(s)] : EKG obtained to assist in diagnosis and management of assessed problem(s)

## 2024-04-02 NOTE — REVIEW OF SYSTEMS
[SOB] : no shortness of breath [Dyspnea on exertion] : not dyspnea during exertion [Chest Discomfort] : no chest discomfort [Leg Claudication] : no intermittent leg claudication [Lower Ext Edema] : no extremity edema [Orthopnea] : no orthopnea [Palpitations] : no palpitations [PND] : no PND [Syncope] : no syncope [Memory Lapses Or Loss] : memory lapses or loss [Negative] : Integumentary [de-identified] : short term memory loss as per wife

## 2024-05-08 ENCOUNTER — RX RENEWAL (OUTPATIENT)
Age: 69
End: 2024-05-08

## 2024-05-09 ENCOUNTER — APPOINTMENT (OUTPATIENT)
Dept: FAMILY MEDICINE | Facility: CLINIC | Age: 69
End: 2024-05-09
Payer: COMMERCIAL

## 2024-05-09 VITALS
SYSTOLIC BLOOD PRESSURE: 130 MMHG | TEMPERATURE: 97.3 F | RESPIRATION RATE: 16 BRPM | WEIGHT: 239 LBS | HEIGHT: 73 IN | HEART RATE: 65 BPM | BODY MASS INDEX: 31.68 KG/M2 | DIASTOLIC BLOOD PRESSURE: 80 MMHG | OXYGEN SATURATION: 98 %

## 2024-05-09 PROCEDURE — 99214 OFFICE O/P EST MOD 30 MIN: CPT

## 2024-05-09 RX ORDER — ATORVASTATIN CALCIUM 20 MG/1
20 TABLET, FILM COATED ORAL
Qty: 90 | Refills: 0 | Status: ACTIVE | COMMUNITY
Start: 2023-06-14 | End: 1900-01-01

## 2024-05-09 RX ORDER — SPIRONOLACTONE 25 MG/1
25 TABLET ORAL DAILY
Qty: 90 | Refills: 0 | Status: ACTIVE | COMMUNITY
Start: 2023-06-14 | End: 1900-01-01

## 2024-05-09 RX ORDER — FOLIC ACID 1 MG/1
1 TABLET ORAL
Qty: 90 | Refills: 1 | Status: ACTIVE | COMMUNITY
Start: 2023-06-14 | End: 1900-01-01

## 2024-05-09 RX ORDER — SACUBITRIL AND VALSARTAN 97; 103 MG/1; MG/1
97-103 TABLET, FILM COATED ORAL TWICE DAILY
Qty: 180 | Refills: 1 | Status: ACTIVE | COMMUNITY
Start: 1900-01-01 | End: 1900-01-01

## 2024-05-09 RX ORDER — ASPIRIN 81 MG/1
81 TABLET, COATED ORAL
Qty: 90 | Refills: 0 | Status: ACTIVE | COMMUNITY
Start: 2024-03-21 | End: 1900-01-01

## 2024-05-09 RX ORDER — CARVEDILOL 25 MG/1
25 TABLET, FILM COATED ORAL TWICE DAILY
Qty: 180 | Refills: 1 | Status: ACTIVE | COMMUNITY
Start: 2023-06-14 | End: 1900-01-01

## 2024-05-13 ENCOUNTER — APPOINTMENT (OUTPATIENT)
Dept: CARDIOLOGY | Facility: CLINIC | Age: 69
End: 2024-05-13
Payer: COMMERCIAL

## 2024-05-13 VITALS
WEIGHT: 235 LBS | HEIGHT: 73 IN | BODY MASS INDEX: 31.14 KG/M2 | HEART RATE: 66 BPM | DIASTOLIC BLOOD PRESSURE: 64 MMHG | SYSTOLIC BLOOD PRESSURE: 112 MMHG | OXYGEN SATURATION: 97 %

## 2024-05-13 DIAGNOSIS — R94.31 ABNORMAL ELECTROCARDIOGRAM [ECG] [EKG]: ICD-10-CM

## 2024-05-13 DIAGNOSIS — R07.9 CHEST PAIN, UNSPECIFIED: ICD-10-CM

## 2024-05-13 DIAGNOSIS — E78.5 HYPERLIPIDEMIA, UNSPECIFIED: ICD-10-CM

## 2024-05-13 DIAGNOSIS — R79.89 OTHER SPECIFIED ABNORMAL FINDINGS OF BLOOD CHEMISTRY: ICD-10-CM

## 2024-05-13 PROCEDURE — G2211 COMPLEX E/M VISIT ADD ON: CPT | Mod: NC,1L

## 2024-05-13 PROCEDURE — 99214 OFFICE O/P EST MOD 30 MIN: CPT

## 2024-05-13 NOTE — DISCUSSION/SUMMARY
[FreeTextEntry1] : Cardiomyopathy - past admission to hospital for acute heart failure.   Increase Entresto 97/103 mg twice daily.,  Coreg 25 mg twice daily, Aldactone 25 mg daily .  No room to uptitrate Entresto. Echocardiogram confirmed LVEF 50 to 55% with moderate mitral regurgitation with normal LV size.  No evidence of volume overload.  Will discontinue Lasix at this point.  No need for ICD placement.  Echocardiogram for reevaluation of LVEF and mitral regurgitation.  I started him on Farxiga 10 mg daily.  All the side effect has been discussed with him.  Hypertension -well-controlled; low-salt diet, continue medications, BP monitoring.  Obesity -weight reduction with diet and exercise.  Prediabetes -long-term goals blood pressure less than 130/80, A1c less than 7, LDL 70; diabetic diet.  Risk factor modification has been discussed with her at great length graded walking, compliance to medication, low-salt/low-cholesterol/diabetic diet. He will be reeval by me in 2 months.  Red flag symptoms has been discussed with him and his wife.  Suspect sleep apnea syndrome -consider sleep study.

## 2024-05-13 NOTE — HISTORY OF PRESENT ILLNESS
[FreeTextEntry1] : 68 years old -American gentleman with history of hypertension, dyslipidemia, hypogonadism, history of CAD, history of heart failure came for echo follow-up follow-up.  March 20, 2024 echocardiogram showed LVEF between 45 to 50%, mild LVH, mild mitral regurgitation aortic root 4.6 cm  He denies any chest pain, PND, orthopnea, diaphoresis, dizziness, palpitation Edema.  He is avoiding salt.  Currently he is on  Entresto 49/51 mg twice daily , Coreg 25 mg twice daily, Aldactone 25 mg daily; he is tolerating very well.  He has started to walk and able to do that  February 29, 2024   BMP normal except fasting blood sugar 104, LDL 78, A1c 6.0 .December 5, 2023   echocardiogram showed normal size, LV thickness, LVEF 50 to 55%, moderate eccentric anteriorly directed jet of mitral regurgitation, PASP 34 mmHg, grade 1 diastolic dysfunction; completely resolved LV dysfunction  He was admitted for uncontrolled hypertension, initially he needed nitroglycerin drip, subsequently his blood pressure is well controlled on p.o. medications.  He also had metabolic encephalopathy requiring psych evaluation.  Echocardiogram confirmed dilated hypertrophied LV with LVIDD 6.6, LVEF 25%.  Nuclear stress test showed small to medium size defect of mild to moderate severity in the basal inferior and apical inferior wall suggestive of infarct with a diaphragmatic attenuation artifact.  CT brain did not confirm any infarct.  He was diagnosed to have acute on chronic systolic heart failure.    He has no recurrent episode of heart failure.  He is compliant medication.  Recently losartan has been added to his regimen.

## 2024-05-14 ENCOUNTER — APPOINTMENT (OUTPATIENT)
Dept: FAMILY MEDICINE | Facility: CLINIC | Age: 69
End: 2024-05-14
Payer: COMMERCIAL

## 2024-05-14 VITALS
RESPIRATION RATE: 16 BRPM | SYSTOLIC BLOOD PRESSURE: 130 MMHG | OXYGEN SATURATION: 98 % | HEART RATE: 67 BPM | BODY MASS INDEX: 31.14 KG/M2 | TEMPERATURE: 98.1 F | WEIGHT: 235 LBS | HEIGHT: 73 IN | DIASTOLIC BLOOD PRESSURE: 70 MMHG

## 2024-05-14 DIAGNOSIS — I50.9 HEART FAILURE, UNSPECIFIED: ICD-10-CM

## 2024-05-14 DIAGNOSIS — I10 ESSENTIAL (PRIMARY) HYPERTENSION: ICD-10-CM

## 2024-05-14 DIAGNOSIS — I25.10 ATHEROSCLEROTIC HEART DISEASE OF NATIVE CORONARY ARTERY W/OUT ANGINA PECTORIS: ICD-10-CM

## 2024-05-14 DIAGNOSIS — I67.81 ACUTE CEREBROVASCULAR INSUFFICIENCY: ICD-10-CM

## 2024-05-14 PROCEDURE — 99214 OFFICE O/P EST MOD 30 MIN: CPT

## 2024-05-14 RX ORDER — FUROSEMIDE 20 MG/1
20 TABLET ORAL
Qty: 90 | Refills: 0 | Status: DISCONTINUED | COMMUNITY
Start: 2024-05-09 | End: 2024-05-14

## 2024-05-14 NOTE — PLAN
[FreeTextEntry1] : 68-year-old gentleman presents for evaluation Cardiomyopathy-history of coronary artery disease with ischemic cardiomyopathy and hypertension 130/78 pulse rate 67 and regular Seen by cardiology yesterday discussed with patient about the need for Farxiga started for cardiomyopathy As per cardiology his Lasix has been discontinued Cerebrovascular insufficiency-history of CVA with cognitive defect

## 2024-06-06 RX ORDER — DAPAGLIFLOZIN 10 MG/1
10 TABLET, FILM COATED ORAL DAILY
Qty: 90 | Refills: 1 | Status: ACTIVE | COMMUNITY
Start: 2024-05-13

## 2024-07-09 ENCOUNTER — APPOINTMENT (OUTPATIENT)
Dept: CARDIOLOGY | Facility: CLINIC | Age: 69
End: 2024-07-09

## 2024-07-12 ENCOUNTER — APPOINTMENT (OUTPATIENT)
Dept: CARDIOLOGY | Facility: CLINIC | Age: 69
End: 2024-07-12
Payer: COMMERCIAL

## 2024-07-12 VITALS
BODY MASS INDEX: 31.14 KG/M2 | SYSTOLIC BLOOD PRESSURE: 132 MMHG | WEIGHT: 235 LBS | OXYGEN SATURATION: 98 % | DIASTOLIC BLOOD PRESSURE: 66 MMHG | HEART RATE: 54 BPM | HEIGHT: 73 IN

## 2024-07-12 DIAGNOSIS — E78.5 HYPERLIPIDEMIA, UNSPECIFIED: ICD-10-CM

## 2024-07-12 DIAGNOSIS — I50.9 HEART FAILURE, UNSPECIFIED: ICD-10-CM

## 2024-07-12 DIAGNOSIS — I25.10 ATHEROSCLEROTIC HEART DISEASE OF NATIVE CORONARY ARTERY W/OUT ANGINA PECTORIS: ICD-10-CM

## 2024-07-12 DIAGNOSIS — I10 ESSENTIAL (PRIMARY) HYPERTENSION: ICD-10-CM

## 2024-07-12 DIAGNOSIS — Z79.899 OTHER LONG TERM (CURRENT) DRUG THERAPY: ICD-10-CM

## 2024-07-12 PROCEDURE — 99215 OFFICE O/P EST HI 40 MIN: CPT

## 2024-08-22 ENCOUNTER — APPOINTMENT (OUTPATIENT)
Dept: FAMILY MEDICINE | Facility: CLINIC | Age: 69
End: 2024-08-22
Payer: COMMERCIAL

## 2024-08-22 VITALS
HEIGHT: 73 IN | OXYGEN SATURATION: 96 % | HEART RATE: 60 BPM | WEIGHT: 233 LBS | RESPIRATION RATE: 16 BRPM | SYSTOLIC BLOOD PRESSURE: 130 MMHG | TEMPERATURE: 97.6 F | BODY MASS INDEX: 30.88 KG/M2 | DIASTOLIC BLOOD PRESSURE: 80 MMHG

## 2024-08-22 DIAGNOSIS — I10 ESSENTIAL (PRIMARY) HYPERTENSION: ICD-10-CM

## 2024-08-22 DIAGNOSIS — R05.9 COUGH, UNSPECIFIED: ICD-10-CM

## 2024-08-22 DIAGNOSIS — I50.9 HEART FAILURE, UNSPECIFIED: ICD-10-CM

## 2024-08-22 DIAGNOSIS — R05.1 ACUTE COUGH: ICD-10-CM

## 2024-08-22 DIAGNOSIS — I67.81 ACUTE CEREBROVASCULAR INSUFFICIENCY: ICD-10-CM

## 2024-08-22 DIAGNOSIS — I25.10 ATHEROSCLEROTIC HEART DISEASE OF NATIVE CORONARY ARTERY W/OUT ANGINA PECTORIS: ICD-10-CM

## 2024-08-22 DIAGNOSIS — E78.5 HYPERLIPIDEMIA, UNSPECIFIED: ICD-10-CM

## 2024-08-22 PROCEDURE — 99214 OFFICE O/P EST MOD 30 MIN: CPT

## 2024-08-22 NOTE — PLAN
[FreeTextEntry1] : 68-year-old male presents for evaluation Accompanied by his wife She becomes animated and is frustrated that Siddharth spends his day sitting in a chair in front yard watching the grandchildren She feels he should be more productive with his life Cabins situation has been complicated by 3 issues Cerebrovascular insufficiency-he was believed to have suffered a CVA which involved and has led to cognitive defect and short-term memory loss.  They are afraid that the condition may be slowly progressive she wants him to enjoy his life and be as active as possible Congestive cardiomyopathy-history of CHF on multiple medications and followed closely by cardiology Tracheitis-allergic cough.  Concerned that this might be an forerunner to CHF.  No signs or symptoms of fluid buildup or shortness of breath is noted Lab work is done for evaluation

## 2024-10-14 ENCOUNTER — APPOINTMENT (OUTPATIENT)
Dept: CARDIOLOGY | Facility: CLINIC | Age: 69
End: 2024-10-14
Payer: COMMERCIAL

## 2024-10-14 PROCEDURE — 93306 TTE W/DOPPLER COMPLETE: CPT

## 2024-10-16 ENCOUNTER — APPOINTMENT (OUTPATIENT)
Dept: CARDIOLOGY | Facility: CLINIC | Age: 69
End: 2024-10-16
Payer: COMMERCIAL

## 2024-10-16 VITALS
WEIGHT: 234 LBS | BODY MASS INDEX: 31.01 KG/M2 | HEIGHT: 73 IN | OXYGEN SATURATION: 97 % | SYSTOLIC BLOOD PRESSURE: 130 MMHG | DIASTOLIC BLOOD PRESSURE: 70 MMHG | HEART RATE: 56 BPM

## 2024-10-16 DIAGNOSIS — I50.9 HEART FAILURE, UNSPECIFIED: ICD-10-CM

## 2024-10-16 DIAGNOSIS — I10 ESSENTIAL (PRIMARY) HYPERTENSION: ICD-10-CM

## 2024-10-16 DIAGNOSIS — R07.9 CHEST PAIN, UNSPECIFIED: ICD-10-CM

## 2024-10-16 DIAGNOSIS — I67.81 ACUTE CEREBROVASCULAR INSUFFICIENCY: ICD-10-CM

## 2024-10-16 DIAGNOSIS — R94.31 ABNORMAL ELECTROCARDIOGRAM [ECG] [EKG]: ICD-10-CM

## 2024-10-16 DIAGNOSIS — Z92.29 PERSONAL HISTORY OF OTHER DRUG THERAPY: ICD-10-CM

## 2024-10-16 DIAGNOSIS — E78.5 HYPERLIPIDEMIA, UNSPECIFIED: ICD-10-CM

## 2024-10-16 DIAGNOSIS — I25.10 ATHEROSCLEROTIC HEART DISEASE OF NATIVE CORONARY ARTERY W/OUT ANGINA PECTORIS: ICD-10-CM

## 2024-10-16 DIAGNOSIS — R05.1 ACUTE COUGH: ICD-10-CM

## 2024-10-16 DIAGNOSIS — R79.89 OTHER SPECIFIED ABNORMAL FINDINGS OF BLOOD CHEMISTRY: ICD-10-CM

## 2024-10-16 PROCEDURE — 99214 OFFICE O/P EST MOD 30 MIN: CPT

## 2024-10-16 PROCEDURE — G2211 COMPLEX E/M VISIT ADD ON: CPT | Mod: NC

## 2024-10-17 ENCOUNTER — RX RENEWAL (OUTPATIENT)
Age: 69
End: 2024-10-17

## 2024-11-21 ENCOUNTER — APPOINTMENT (OUTPATIENT)
Dept: FAMILY MEDICINE | Facility: CLINIC | Age: 69
End: 2024-11-21
Payer: COMMERCIAL

## 2024-11-21 VITALS
SYSTOLIC BLOOD PRESSURE: 120 MMHG | HEIGHT: 73 IN | OXYGEN SATURATION: 97 % | DIASTOLIC BLOOD PRESSURE: 80 MMHG | HEART RATE: 56 BPM | TEMPERATURE: 97 F | BODY MASS INDEX: 31.28 KG/M2 | WEIGHT: 236 LBS | RESPIRATION RATE: 16 BRPM

## 2024-11-21 DIAGNOSIS — R41.3 OTHER AMNESIA: ICD-10-CM

## 2024-11-21 DIAGNOSIS — E78.5 HYPERLIPIDEMIA, UNSPECIFIED: ICD-10-CM

## 2024-11-21 DIAGNOSIS — I50.9 HEART FAILURE, UNSPECIFIED: ICD-10-CM

## 2024-11-21 DIAGNOSIS — I25.10 ATHEROSCLEROTIC HEART DISEASE OF NATIVE CORONARY ARTERY W/OUT ANGINA PECTORIS: ICD-10-CM

## 2024-11-21 PROCEDURE — 99214 OFFICE O/P EST MOD 30 MIN: CPT

## 2025-01-09 ENCOUNTER — APPOINTMENT (OUTPATIENT)
Dept: FAMILY MEDICINE | Facility: CLINIC | Age: 70
End: 2025-01-09
Payer: COMMERCIAL

## 2025-01-09 VITALS
RESPIRATION RATE: 16 BRPM | TEMPERATURE: 98 F | BODY MASS INDEX: 32.01 KG/M2 | HEIGHT: 73 IN | HEART RATE: 59 BPM | SYSTOLIC BLOOD PRESSURE: 130 MMHG | DIASTOLIC BLOOD PRESSURE: 80 MMHG | OXYGEN SATURATION: 97 % | WEIGHT: 241.5 LBS

## 2025-01-09 DIAGNOSIS — I10 ESSENTIAL (PRIMARY) HYPERTENSION: ICD-10-CM

## 2025-01-09 DIAGNOSIS — I25.10 ATHEROSCLEROTIC HEART DISEASE OF NATIVE CORONARY ARTERY W/OUT ANGINA PECTORIS: ICD-10-CM

## 2025-01-09 DIAGNOSIS — N62 HYPERTROPHY OF BREAST: ICD-10-CM

## 2025-01-09 DIAGNOSIS — R07.9 CHEST PAIN, UNSPECIFIED: ICD-10-CM

## 2025-01-09 DIAGNOSIS — R41.3 OTHER AMNESIA: ICD-10-CM

## 2025-01-09 DIAGNOSIS — E78.5 HYPERLIPIDEMIA, UNSPECIFIED: ICD-10-CM

## 2025-01-09 DIAGNOSIS — I67.81 ACUTE CEREBROVASCULAR INSUFFICIENCY: ICD-10-CM

## 2025-01-09 PROCEDURE — 99214 OFFICE O/P EST MOD 30 MIN: CPT

## 2025-01-29 ENCOUNTER — RESULT REVIEW (OUTPATIENT)
Age: 70
End: 2025-01-29

## 2025-01-29 ENCOUNTER — APPOINTMENT (OUTPATIENT)
Dept: MAMMOGRAPHY | Facility: CLINIC | Age: 70
End: 2025-01-29
Payer: COMMERCIAL

## 2025-01-29 ENCOUNTER — APPOINTMENT (OUTPATIENT)
Dept: ULTRASOUND IMAGING | Facility: CLINIC | Age: 70
End: 2025-01-29
Payer: COMMERCIAL

## 2025-01-29 PROCEDURE — 76641 ULTRASOUND BREAST COMPLETE: CPT | Mod: 50

## 2025-01-29 PROCEDURE — 77066 DX MAMMO INCL CAD BI: CPT

## 2025-01-29 PROCEDURE — G0279: CPT

## 2025-02-20 ENCOUNTER — NON-APPOINTMENT (OUTPATIENT)
Age: 70
End: 2025-02-20

## 2025-02-20 ENCOUNTER — APPOINTMENT (OUTPATIENT)
Dept: CARDIOLOGY | Facility: CLINIC | Age: 70
End: 2025-02-20
Payer: COMMERCIAL

## 2025-02-20 VITALS
BODY MASS INDEX: 33.32 KG/M2 | SYSTOLIC BLOOD PRESSURE: 130 MMHG | DIASTOLIC BLOOD PRESSURE: 70 MMHG | WEIGHT: 246 LBS | HEART RATE: 58 BPM | OXYGEN SATURATION: 98 % | HEIGHT: 72 IN

## 2025-02-20 DIAGNOSIS — R07.9 CHEST PAIN, UNSPECIFIED: ICD-10-CM

## 2025-02-20 DIAGNOSIS — I25.10 ATHEROSCLEROTIC HEART DISEASE OF NATIVE CORONARY ARTERY W/OUT ANGINA PECTORIS: ICD-10-CM

## 2025-02-20 DIAGNOSIS — N62 HYPERTROPHY OF BREAST: ICD-10-CM

## 2025-02-20 DIAGNOSIS — G47.33 OBSTRUCTIVE SLEEP APNEA (ADULT) (PEDIATRIC): ICD-10-CM

## 2025-02-20 DIAGNOSIS — E78.5 HYPERLIPIDEMIA, UNSPECIFIED: ICD-10-CM

## 2025-02-20 DIAGNOSIS — I10 ESSENTIAL (PRIMARY) HYPERTENSION: ICD-10-CM

## 2025-02-20 DIAGNOSIS — R41.3 OTHER AMNESIA: ICD-10-CM

## 2025-02-20 DIAGNOSIS — R94.31 ABNORMAL ELECTROCARDIOGRAM [ECG] [EKG]: ICD-10-CM

## 2025-02-20 DIAGNOSIS — I50.9 HEART FAILURE, UNSPECIFIED: ICD-10-CM

## 2025-02-20 PROCEDURE — 93000 ELECTROCARDIOGRAM COMPLETE: CPT

## 2025-02-20 PROCEDURE — 99214 OFFICE O/P EST MOD 30 MIN: CPT

## 2025-02-20 PROCEDURE — G2211 COMPLEX E/M VISIT ADD ON: CPT | Mod: NC
